# Patient Record
Sex: FEMALE | Race: BLACK OR AFRICAN AMERICAN | NOT HISPANIC OR LATINO | URBAN - NONMETROPOLITAN AREA
[De-identification: names, ages, dates, MRNs, and addresses within clinical notes are randomized per-mention and may not be internally consistent; named-entity substitution may affect disease eponyms.]

---

## 2018-04-30 PROBLEM — H52.223: Noted: 2018-04-30

## 2018-04-30 PROBLEM — H52.02: Noted: 2018-04-30

## 2020-08-12 NOTE — PATIENT DISCUSSION
Discussed treatment options for OS (Cataract with Trab) then when stable Cataract OD with glc tx, ie: hydrus /omni.

## 2020-08-25 ENCOUNTER — IMPORTED ENCOUNTER (OUTPATIENT)
Dept: URBAN - NONMETROPOLITAN AREA CLINIC 1 | Facility: CLINIC | Age: 35
End: 2020-08-25

## 2020-08-25 PROCEDURE — 92015 DETERMINE REFRACTIVE STATE: CPT

## 2020-08-25 PROCEDURE — 92014 COMPRE OPH EXAM EST PT 1/>: CPT

## 2020-09-09 NOTE — PATIENT DISCUSSION
DOING WELL, CONTINUE DUREZOL QID, DECREASE VIGAMOX TID IN OS. WILL CONTINUE COMBIGAN UNTIL 50 HOURS BEFORE NEXT APPT IN OS.  BCL REPLACED. NO EYE RUBBING, SHIELD QHS.

## 2020-10-07 NOTE — PATIENT DISCUSSION
DOING WELL, CONTINUE DUREZOL QID AND VIGAMOX TID IN OS. BCL REPLACED. NO EYE RUBBING, SHIELD QHS.  +/- LSL next visit.

## 2020-10-28 NOTE — PROCEDURE NOTE: CLINICAL
PROCEDURE NOTE: Laser Suture Lysis #1 OS. Diagnosis: POAG, Severe. Prior to treatment, risks/benefits/alternatives discussed including infection, loss of vision, hemorrhage, cataract, glaucoma, retinal tears or detachment. Lens used: [place]. Pulse power: 500-700 mW. Spot size: 50 um. Duration: 0.01 sec. There were no complications. Patient tolerated procedure well. Post procedure instructions given. Matt Kennedy

## 2020-11-18 NOTE — PATIENT DISCUSSION
Good postoperative appearance. Plan: LSL #2 of 2 today. Discussed that pt to wear shield qhs, no eye rubbing. Durezol qid.

## 2020-11-18 NOTE — PROCEDURE NOTE: CLINICAL
PROCEDURE NOTE: Laser Suture Lysis #2 OS. Diagnosis: POAG, Severe. Prior to treatment, risks/benefits/alternatives discussed including infection, loss of vision, hemorrhage, cataract, glaucoma, retinal tears or detachment. Lens used: [place]. Pulse power: 500-700 mW. Spot size: 50 um. Duration: 0.01 sec. There were no complications. Patient tolerated procedure well. Post procedure instructions given. Elvia Doe

## 2021-04-07 NOTE — PATIENT DISCUSSION
PT REQUESTED MR AS HE NEEDS UPDATED SAFETY GLASSES FOR WORK.  ADVISED RX WILL CHANGE.  HE UNDERSTANDS.

## 2021-08-04 NOTE — PATIENT DISCUSSION
REC: LOWER IOP OS. DISCUSSED OPTIONS. PLAN: TCNR IN OFFICE OS TODAY. NO COMPLICATIONS. INCREASE DUREZOL QID OS. TA: 03 AFTRWARDS. NO EYE RUBBING, SHIELD QHS. ALL RESTRICTIONS APPLY.

## 2021-08-04 NOTE — PROCEDURE NOTE: CLINICAL
PROCEDURE NOTE: Bleb Needling #1 OS. Diagnosis: POAG, Severe. Prior to treatment, risks/benefits/alternatives discussed including infection, loss of vision, hemorrhage, cataract, glaucoma, retinal tears or detachment. The patient was brought to the minor operative suite and was prepped and draped in the usual sterile fashion. A lid speculum was placed in the operative eye. Topical Tertacaine and subconjuctival Lidocaine were administered. A 27 gauge needle was used to break up subconjunctival scar tissue and to reform the bleb. 0.4 mg/ml Mitomycin was diluted by 50% with Lidocaine and injected subconjunctivally above the failing bleb. A cotton swab was used to spread the Mitomycin. The wound was inspected and found to be water-tight. Patient tolerated procedure well. There were no complications. Post procedure instructions given. Letty Tavarez

## 2021-11-10 NOTE — PATIENT DISCUSSION
IOP ELEVATED OS.  ADD COMBIGAN TO OS.  CONTINUE DUREZOL QD FOR 2 WEEKS THEN STOP.  ADVISED MAY NEED TCNR IN THE FUTURE.

## 2022-03-30 NOTE — PATIENT DISCUSSION
IOP TOO HIGH.  RECOMMEND VISCOCANALOSTOMY W/ HYDRUS OR ISTENT AT TIME OF CE/IOL.  SCHEDULE SO'S, REFRACTION, NEXT AVAILABLE.  WILL ALSO SCHEDULE HVF PRIOR TO SCANS (BASELINE).

## 2022-04-10 ASSESSMENT — VISUAL ACUITY
OS_SC: 20/25
OD_SC: 20/25
OD_CC: J1
OS_CC: J1

## 2022-04-21 NOTE — PATIENT DISCUSSION
Patient needs to be rescheduled for tomorrow's appointment with Dr Beaulieu. Attempted to call, number is not in service, patient is not on patient portal. Patient contact is his spouse but they do not have verbal consent established. A letter via mail will not be received in time.   Smoking cessation discussed.

## 2022-04-26 NOTE — PATIENT DISCUSSION
PLAN: CE/IOL W/VISCOCANALOSTOMY AND HYDRUS OD. +/-TRYPAN BLUE, goal [place] p.o w/Dr. Phoenix Shelley needed. Not a candidate for AV/CV. Pt wants BASIC only, understands he will most likely need glasses for all ranges. Discussed limitations in great detail. No Imprimis.

## 2022-04-26 NOTE — PATIENT DISCUSSION
PLAN: CE/IOL W/VISCOCANALOSTOMY AND HYDRUS OD. +/-TRYPAN BLUE, goal [place], p.o w/Dr. Ria Herrera needed. Not a candidate for AV/CV. Pt wants BASIC only, understands he will most likely need glasses for all ranges. Discussed limitations in great detail. No Imprimis.

## 2022-04-26 NOTE — PATIENT DISCUSSION
PLAN: CE/IOL W/VISCOCANALOSTOMY AND HYDRUS OD. +/-TRYPAN BLUE, goal [place] p.o w/Dr. Patricia Channel needed. Not a candidate for AV/CV. Pt wants BASIC only, understands he will most likely need glasses for all ranges. Discussed limitations in great detail. No Imprimis.

## 2022-04-27 NOTE — PATIENT DISCUSSION
PLAN: CE/IOL W/VISCOCANALOSTOMY AND HYDRUS OD. +/-TRYPAN BLUE, goal [place] p.o w/Dr. Alys Gowers needed. Not a candidate for AV/CV. Pt wants BASIC only, understands he will most likely need glasses for all ranges. Discussed limitations in great detail. No Imprimis.

## 2022-05-10 NOTE — PATIENT DISCUSSION
PLAN: CE/IOL W/VISCOCANALOSTOMY AND HYDRUS OD. +/-TRYPAN BLUE, goal ana maria stafford w/Dr. Sierra Sports needed. Not a candidate for AV/CV. Pt wants BASIC only, understands he will most likely need glasses for all ranges. Discussed limitations in great detail. No Imprimis.

## 2022-05-26 NOTE — PATIENT DISCUSSION
PLAN: CE/IOL W/VISCOCANALOSTOMY AND HYDRUS OD. +/-TRYPAN BLUE, goal jerome stafford.marissa w/Dr. Tony Denis needed. Not a candidate for AV/CV. Pt wants BASIC only, understands he will most likely need glasses for all ranges. Discussed limitations in great detail. No Imprimis.

## 2022-08-30 NOTE — PATIENT DISCUSSION
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 The patient was advised that there will be a necessary period of adaptation to this new vision and they will miss their unaided reading vision. The patient understands and chooses good uncorrected distance vision with the possibility of a refractive surprise. None known

## 2023-05-03 NOTE — PATIENT DISCUSSION
The patient feels that the cataract is significantly impacting daily activities and has elected cataract surgery. The risks, benefits, and alternatives to surgery were discussed. The patient elects to proceed with surgery. 71

## 2023-08-09 NOTE — PATIENT DISCUSSION
Continue current management. Simponi Counseling:  I discussed with the patient the risks of golimumab including but not limited to myelosuppression, immunosuppression, autoimmune hepatitis, demyelinating diseases, lymphoma, and serious infections.  The patient understands that monitoring is required including a PPD at baseline and must alert us or the primary physician if symptoms of infection or other concerning signs are noted.

## 2024-01-10 NOTE — PATIENT DISCUSSION
Patient sending MyChart asking about supplement safety during pregnancy. Please advise if you would like e-visit to discuss.     Pako JUAN RN 1/10/2024 at 7:12 AM     Post op gtt instructions reviewed with patient.

## 2025-05-13 NOTE — PATIENT DISCUSSION
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 The patient was advised that there will be a necessary period of adaptation to this new vision and they will miss their unaided reading vision. The patient understands and chooses good uncorrected distance vision with the possibility of a refractive surprise.
